# Patient Record
Sex: FEMALE
[De-identification: names, ages, dates, MRNs, and addresses within clinical notes are randomized per-mention and may not be internally consistent; named-entity substitution may affect disease eponyms.]

---

## 2018-04-23 ENCOUNTER — HOSPITAL ENCOUNTER (OUTPATIENT)
Dept: HOSPITAL 95 - ORSCMMR | Age: 33
Discharge: HOME | End: 2018-04-23
Attending: OBSTETRICS & GYNECOLOGY
Payer: COMMERCIAL

## 2018-04-23 VITALS — WEIGHT: 120.99 LBS | HEIGHT: 64.02 IN | BODY MASS INDEX: 20.66 KG/M2

## 2018-04-23 DIAGNOSIS — N80.3: ICD-10-CM

## 2018-04-23 DIAGNOSIS — N83.291: Primary | ICD-10-CM

## 2018-04-23 DIAGNOSIS — N94.6: ICD-10-CM

## 2018-04-23 DIAGNOSIS — R10.2: ICD-10-CM

## 2018-04-23 PROCEDURE — 0UT54ZZ RESECTION OF RIGHT FALLOPIAN TUBE, PERCUTANEOUS ENDOSCOPIC APPROACH: ICD-10-PCS | Performed by: OBSTETRICS & GYNECOLOGY

## 2018-04-23 PROCEDURE — 0UT04ZZ RESECTION OF RIGHT OVARY, PERCUTANEOUS ENDOSCOPIC APPROACH: ICD-10-PCS | Performed by: OBSTETRICS & GYNECOLOGY

## 2018-04-23 PROCEDURE — 0UBF4ZX EXCISION OF CUL-DE-SAC, PERCUTANEOUS ENDOSCOPIC APPROACH, DIAGNOSTIC: ICD-10-PCS | Performed by: OBSTETRICS & GYNECOLOGY

## 2021-07-13 ENCOUNTER — HOSPITAL ENCOUNTER (OUTPATIENT)
Dept: HOSPITAL 95 - LAB | Age: 36
Discharge: HOME | End: 2021-07-13
Attending: OBSTETRICS & GYNECOLOGY
Payer: COMMERCIAL

## 2021-07-13 DIAGNOSIS — Z01.419: Primary | ICD-10-CM

## 2021-07-13 PROCEDURE — G0123 SCREEN CERV/VAG THIN LAYER: HCPCS

## 2021-07-14 LAB — OTHER STN SPEC: (no result)

## 2021-11-15 ENCOUNTER — HOSPITAL ENCOUNTER (OUTPATIENT)
Dept: HOSPITAL 95 - ORSCMMR | Age: 36
Discharge: HOME | End: 2021-11-15
Attending: OBSTETRICS & GYNECOLOGY
Payer: COMMERCIAL

## 2021-11-15 VITALS — BODY MASS INDEX: 19.24 KG/M2 | HEIGHT: 67 IN | WEIGHT: 122.58 LBS

## 2021-11-15 DIAGNOSIS — N92.0: ICD-10-CM

## 2021-11-15 DIAGNOSIS — F41.9: ICD-10-CM

## 2021-11-15 DIAGNOSIS — N94.6: ICD-10-CM

## 2021-11-15 DIAGNOSIS — N80.3: Primary | ICD-10-CM

## 2021-11-15 DIAGNOSIS — N80.0: ICD-10-CM

## 2021-11-15 DIAGNOSIS — R10.2: ICD-10-CM

## 2021-11-15 PROCEDURE — 0UT6FZZ RESECTION OF LEFT FALLOPIAN TUBE, VIA NATURAL OR ARTIFICIAL OPENING WITH PERCUTANEOUS ENDOSCOPIC ASSISTANCE: ICD-10-PCS | Performed by: OBSTETRICS & GYNECOLOGY

## 2021-11-15 PROCEDURE — A9270 NON-COVERED ITEM OR SERVICE: HCPCS

## 2021-11-15 PROCEDURE — 0UT9FZZ RESECTION OF UTERUS, VIA NATURAL OR ARTIFICIAL OPENING WITH PERCUTANEOUS ENDOSCOPIC ASSISTANCE: ICD-10-PCS | Performed by: OBSTETRICS & GYNECOLOGY

## 2021-11-15 NOTE — NUR
Pt ok with student nurse caring for her. dc'd eisenberg per orders, Pt up to
bathroom standby assist. No complaints of pain, nausea, dizziness upon
ambulation. unable to void at this time. Plan to continue to await first post
eisenberg removal void.

## 2021-11-15 NOTE — NUR
DISCHARGE
PT LEFT AT 1330. IV REMOVED PRIOR TO DISCHARGE. DISCHARGE INSTRUCTIONS GONE
OVER WITH PATIENT. PRESCRIPTIONS TAKEN AND PICKED UP BY  PRIOR TO
DISCHARGE. PT HAS BEEN UP AND VOIDING. TOLERATING PO, NO PAIN OR NAUSEA. SAHRA
PAD DRY, NO DRAINAGE SEEN. ALL BELONGINGS SENT WITH PATIENT.

## 2021-11-15 NOTE — NUR
ARRIVAL TO UNIT
PT ARRIVED TO UNIT AT 0945. PT [id number]. LUNGS CLEAR T/O. K PAD PROVIDED. DENIES
PAIN OR NAUSEA. EDUCATED PT ON NEED TO CALL AS SOON AS SHE FEELS ANY PAIN
BEGINNING. LAP SITES X3 CDI. MEI IN PLACE, WILL REMOVE PER ORDERS. PROVIDED
WITH WATER AND JELLO TO START PO INTAKE. PLAN IS TO ENCOURAGE AMBULATION AND
DEEP BREATHING.

## 2021-11-15 NOTE — NUR
History, Chart, Medications and Allergies reviewed before start of
procedure.Patient confirms NPO status and agrees with scheduled surgery.
Patient reports completing Chlorhexadine shower X2 prior to admission to
hospital.Surgical site prepped with 2% Chlorhexidine cloth wipe.